# Patient Record
Sex: FEMALE | Race: WHITE | NOT HISPANIC OR LATINO | Employment: FULL TIME | URBAN - METROPOLITAN AREA
[De-identification: names, ages, dates, MRNs, and addresses within clinical notes are randomized per-mention and may not be internally consistent; named-entity substitution may affect disease eponyms.]

---

## 2019-08-08 ENCOUNTER — HOSPITAL ENCOUNTER (EMERGENCY)
Facility: HOSPITAL | Age: 29
Discharge: HOME | End: 2019-08-08
Attending: EMERGENCY MEDICINE
Payer: COMMERCIAL

## 2019-08-08 VITALS
WEIGHT: 293 LBS | TEMPERATURE: 98 F | HEART RATE: 72 BPM | DIASTOLIC BLOOD PRESSURE: 72 MMHG | SYSTOLIC BLOOD PRESSURE: 127 MMHG | HEIGHT: 68 IN | RESPIRATION RATE: 20 BRPM | BODY MASS INDEX: 44.41 KG/M2 | OXYGEN SATURATION: 100 %

## 2019-08-08 DIAGNOSIS — R07.9 CHEST PAIN: ICD-10-CM

## 2019-08-08 LAB
ALBUMIN SERPL BCP-MCNC: 4 G/DL (ref 3.5–5.2)
ALP SERPL-CCNC: 54 U/L (ref 55–135)
ALT SERPL W/O P-5'-P-CCNC: 23 U/L (ref 10–44)
ANION GAP SERPL CALC-SCNC: 8 MMOL/L (ref 8–16)
AST SERPL-CCNC: 21 U/L (ref 10–40)
B-HCG UR QL: NEGATIVE
BASOPHILS # BLD AUTO: 0.07 K/UL (ref 0–0.2)
BASOPHILS NFR BLD: 0.8 % (ref 0–1.9)
BILIRUB SERPL-MCNC: 0.8 MG/DL (ref 0.1–1)
BNP SERPL-MCNC: 30 PG/ML (ref 0–99)
BUN SERPL-MCNC: 11 MG/DL (ref 6–20)
CALCIUM SERPL-MCNC: 8.8 MG/DL (ref 8.7–10.5)
CHLORIDE SERPL-SCNC: 105 MMOL/L (ref 95–110)
CO2 SERPL-SCNC: 24 MMOL/L (ref 23–29)
CREAT SERPL-MCNC: 0.7 MG/DL (ref 0.5–1.4)
CTP QC/QA: YES
DIFFERENTIAL METHOD: ABNORMAL
EOSINOPHIL # BLD AUTO: 0.2 K/UL (ref 0–0.5)
EOSINOPHIL NFR BLD: 1.8 % (ref 0–8)
ERYTHROCYTE [DISTWIDTH] IN BLOOD BY AUTOMATED COUNT: 12.9 % (ref 11.5–14.5)
EST. GFR  (AFRICAN AMERICAN): >60 ML/MIN/1.73 M^2
EST. GFR  (NON AFRICAN AMERICAN): >60 ML/MIN/1.73 M^2
GLUCOSE SERPL-MCNC: 122 MG/DL (ref 70–110)
HCT VFR BLD AUTO: 39.5 % (ref 37–48.5)
HGB BLD-MCNC: 12.7 G/DL (ref 12–16)
IMM GRANULOCYTES # BLD AUTO: 0.06 K/UL (ref 0–0.04)
IMM GRANULOCYTES NFR BLD AUTO: 0.7 % (ref 0–0.5)
LYMPHOCYTES # BLD AUTO: 2.8 K/UL (ref 1–4.8)
LYMPHOCYTES NFR BLD: 31.7 % (ref 18–48)
MCH RBC QN AUTO: 26.5 PG (ref 27–31)
MCHC RBC AUTO-ENTMCNC: 32.2 G/DL (ref 32–36)
MCV RBC AUTO: 82 FL (ref 82–98)
MONOCYTES # BLD AUTO: 0.5 K/UL (ref 0.3–1)
MONOCYTES NFR BLD: 6.2 % (ref 4–15)
NEUTROPHILS # BLD AUTO: 5.1 K/UL (ref 1.8–7.7)
NEUTROPHILS NFR BLD: 58.8 % (ref 38–73)
NRBC BLD-RTO: 0 /100 WBC
PLATELET # BLD AUTO: 293 K/UL (ref 150–350)
PMV BLD AUTO: 10.2 FL (ref 9.2–12.9)
POTASSIUM SERPL-SCNC: 3.6 MMOL/L (ref 3.5–5.1)
PROT SERPL-MCNC: 7.1 G/DL (ref 6–8.4)
RBC # BLD AUTO: 4.8 M/UL (ref 4–5.4)
SODIUM SERPL-SCNC: 137 MMOL/L (ref 136–145)
TROPONIN I SERPL DL<=0.01 NG/ML-MCNC: <0.03 NG/ML (ref 0.02–0.04)
TROPONIN I SERPL DL<=0.01 NG/ML-MCNC: <0.03 NG/ML (ref 0.02–0.04)
WBC # BLD AUTO: 8.67 K/UL (ref 3.9–12.7)

## 2019-08-08 PROCEDURE — 25000003 PHARM REV CODE 250: Performed by: EMERGENCY MEDICINE

## 2019-08-08 PROCEDURE — 84484 ASSAY OF TROPONIN QUANT: CPT

## 2019-08-08 PROCEDURE — 99285 EMERGENCY DEPT VISIT HI MDM: CPT

## 2019-08-08 PROCEDURE — 80053 COMPREHEN METABOLIC PANEL: CPT

## 2019-08-08 PROCEDURE — 85025 COMPLETE CBC W/AUTO DIFF WBC: CPT

## 2019-08-08 PROCEDURE — 83880 ASSAY OF NATRIURETIC PEPTIDE: CPT

## 2019-08-08 PROCEDURE — 81025 URINE PREGNANCY TEST: CPT | Performed by: EMERGENCY MEDICINE

## 2019-08-08 PROCEDURE — 93005 ELECTROCARDIOGRAM TRACING: CPT

## 2019-08-08 RX ORDER — METFORMIN HYDROCHLORIDE 500 MG/1
500 TABLET, EXTENDED RELEASE ORAL
COMMUNITY

## 2019-08-08 RX ORDER — OMEPRAZOLE 20 MG/1
20 CAPSULE, DELAYED RELEASE ORAL EVERY MORNING
COMMUNITY

## 2019-08-08 RX ORDER — ERGOCALCIFEROL 1.25 MG/1
50000 CAPSULE ORAL
COMMUNITY

## 2019-08-08 RX ORDER — ASPIRIN 325 MG
325 TABLET ORAL
Status: COMPLETED | OUTPATIENT
Start: 2019-08-08 | End: 2019-08-08

## 2019-08-08 RX ADMIN — ASPIRIN 325 MG ORAL TABLET 325 MG: 325 PILL ORAL at 02:08

## 2019-08-08 NOTE — ED PROVIDER NOTES
Encounter Date: 8/8/2019       History     Chief Complaint   Patient presents with    Chest Pain     epigastric pain and chest pain since last week     28-year-old female presents emergency department complaining of soreness of the chest for the past week.  Patient was seen at a physician's office about a week ago and was diagnosed of having a pulled muscle.  Patient says the pain is persistent and now it feels like tightness along with pressure and his pain is persistent came here.  Patient denies fever or chills or nausea or vomiting or shortness of breath or abdominal pain or weakness and numbness.  Patient states the pain is worse with movement.  Patient denies any worsening of pain with exertion.  The patient was told to follow up with a cardiologist for further testing given she had chest pain about a week ago.        Review of patient's allergies indicates:   Allergen Reactions    Ceclor [cefaclor] Hives     No past medical history on file.  No past surgical history on file.  No family history on file.  Social History     Tobacco Use    Smoking status: Not on file   Substance Use Topics    Alcohol use: Not on file    Drug use: Not on file     Review of Systems   Constitutional: Negative.    HENT: Negative.    Eyes: Negative.    Respiratory: Negative.    Cardiovascular: Positive for chest pain.   Gastrointestinal: Negative.    Endocrine: Negative.    Genitourinary: Negative.    Musculoskeletal: Negative.    Skin: Negative.    Allergic/Immunologic: Negative.    Neurological: Negative.    Hematological: Negative.    Psychiatric/Behavioral: Negative.    All other systems reviewed and are negative.      Physical Exam     Initial Vitals [08/08/19 1257]   BP Pulse Resp Temp SpO2   (!) 179/84 83 17 98.5 °F (36.9 °C) 99 %      MAP       --         Physical Exam    Nursing note and vitals reviewed.  Constitutional: She appears well-developed and well-nourished.   HENT:   Head: Normocephalic and atraumatic.   Nose:  Nose normal.   Mouth/Throat: Oropharynx is clear and moist.   Eyes: Conjunctivae and EOM are normal. Pupils are equal, round, and reactive to light.   Neck: Normal range of motion. Neck supple. No thyromegaly present. No tracheal deviation present. No JVD present.   Cardiovascular: Normal rate, regular rhythm, normal heart sounds and intact distal pulses.   No murmur heard.  Pulmonary/Chest: Breath sounds normal. No stridor. No respiratory distress. She has no wheezes. She has no rales.   Abdominal: Soft. Bowel sounds are normal.   Musculoskeletal: Normal range of motion. She exhibits no edema.   Neurological: She is alert and oriented to person, place, and time. She has normal strength. No cranial nerve deficit. GCS score is 15. GCS eye subscore is 4. GCS verbal subscore is 5. GCS motor subscore is 6.   Skin: Skin is warm. Capillary refill takes less than 2 seconds.   Psychiatric: She has a normal mood and affect. Thought content normal.         ED Course   Procedures  Labs Reviewed   CBC W/ AUTO DIFFERENTIAL   COMPREHENSIVE METABOLIC PANEL   TROPONIN I   B-TYPE NATRIURETIC PEPTIDE     EKG Readings: (Independently Interpreted)   Rhythm: Normal Sinus Rhythm. Ectopy: No Ectopy. Conduction: Normal. ST Segments: Normal ST Segments. T Waves: Normal. Clinical Impression: Normal Sinus Rhythm     ECG Results          EKG 12-lead (In process)  Result time 08/08/19 13:32:47    In process by Interface, Lab In [facility] (08/08/19 13:32:47)                 Narrative:    Test Reason : R07.9,    Vent. Rate : 085 BPM     Atrial Rate : 085 BPM     P-R Int : 190 ms          QRS Dur : 090 ms      QT Int : 374 ms       P-R-T Axes : 037 001 027 degrees     QTc Int : 445 ms    Normal sinus rhythm  Cannot rule out Anterior infarct ,age undetermined  Abnormal ECG  No previous ECGs available    Referred By:             Confirmed By:                             Imaging Results          X-Ray Chest PA And Lateral (In process)                                     ED Course as of Aug 08 1723   u Aug 08, 2019   1640 X-Ray Chest PA And Lateral []   1721 Patient with vague low risk chest pain for the past week.  Pain likely musculoskeletal.  Screening labs reviewed.  Troponin unremarkable. EKG within normal limits and has symptoms resolved and patient does not appear to have cardiac chest pain will discharge with instructions and follow up with outpatient cardiology    [UM]      ED Course User Index  [UM] Keaton Pires MD     Clinical Impression:       ICD-10-CM ICD-9-CM   1. Chest pain R07.9 786.50                                Keaton Pires MD  08/08/19 1724

## 2019-08-08 NOTE — ED NOTES
Pt states she had chest pressure and yesterday she had tingling to the left arm which made her come in today. Shes had it on and off for the last week. She said she thought she pulled a muscle last week so she went to the doctor. They did an EKG and said it looked abnormal. She sent up an appt with a cardiologist, but hasn't seen them yet.

## 2024-05-27 ENCOUNTER — OFFICE VISIT (OUTPATIENT)
Dept: URGENT CARE | Facility: CLINIC | Age: 34
End: 2024-05-27
Payer: COMMERCIAL

## 2024-05-27 VITALS
WEIGHT: 293 LBS | BODY MASS INDEX: 44.41 KG/M2 | DIASTOLIC BLOOD PRESSURE: 82 MMHG | TEMPERATURE: 99 F | RESPIRATION RATE: 17 BRPM | SYSTOLIC BLOOD PRESSURE: 130 MMHG | HEART RATE: 110 BPM | HEIGHT: 68 IN | OXYGEN SATURATION: 98 %

## 2024-05-27 DIAGNOSIS — J06.9 UPPER RESPIRATORY TRACT INFECTION, UNSPECIFIED TYPE: ICD-10-CM

## 2024-05-27 DIAGNOSIS — R05.9 COUGH, UNSPECIFIED TYPE: ICD-10-CM

## 2024-05-27 DIAGNOSIS — J02.9 SORE THROAT: Primary | ICD-10-CM

## 2024-05-27 LAB
CTP QC/QA: YES
CTP QC/QA: YES
FLUAV AG NPH QL: NEGATIVE
FLUBV AG NPH QL: NEGATIVE
S PYO RRNA THROAT QL PROBE: NEGATIVE

## 2024-05-27 PROCEDURE — 87804 INFLUENZA ASSAY W/OPTIC: CPT | Mod: QW,,, | Performed by: STUDENT IN AN ORGANIZED HEALTH CARE EDUCATION/TRAINING PROGRAM

## 2024-05-27 PROCEDURE — 87880 STREP A ASSAY W/OPTIC: CPT | Mod: QW,,, | Performed by: STUDENT IN AN ORGANIZED HEALTH CARE EDUCATION/TRAINING PROGRAM

## 2024-05-27 PROCEDURE — 99204 OFFICE O/P NEW MOD 45 MIN: CPT | Mod: S$GLB,,, | Performed by: STUDENT IN AN ORGANIZED HEALTH CARE EDUCATION/TRAINING PROGRAM

## 2024-05-27 RX ORDER — FLUTICASONE PROPIONATE 50 MCG
1 SPRAY, SUSPENSION (ML) NASAL DAILY
Qty: 11.1 ML | Refills: 0 | Status: SHIPPED | OUTPATIENT
Start: 2024-05-27

## 2024-05-27 RX ORDER — PROMETHAZINE HYDROCHLORIDE AND DEXTROMETHORPHAN HYDROBROMIDE 6.25; 15 MG/5ML; MG/5ML
5 SYRUP ORAL EVERY 4 HOURS PRN
Qty: 118 ML | Refills: 0 | Status: SHIPPED | OUTPATIENT
Start: 2024-05-27 | End: 2024-06-06

## 2024-05-27 RX ORDER — LEVOCETIRIZINE DIHYDROCHLORIDE 5 MG/1
5 TABLET, FILM COATED ORAL NIGHTLY
Qty: 30 TABLET | Refills: 0 | Status: SHIPPED | OUTPATIENT
Start: 2024-05-27 | End: 2025-05-27

## 2024-05-27 RX ORDER — MONTELUKAST SODIUM 10 MG/1
10 TABLET ORAL NIGHTLY
Qty: 30 TABLET | Refills: 0 | Status: SHIPPED | OUTPATIENT
Start: 2024-05-27 | End: 2024-06-26

## 2024-05-27 NOTE — PROGRESS NOTES
"Subjective:      Patient ID: Neida Fernández is a 34 y.o. female.    Vitals:  height is 5' 8" (1.727 m) and weight is 167.8 kg (370 lb) (abnormal). Her oral temperature is 98.5 °F (36.9 °C). Her blood pressure is 130/82 and her pulse is 110. Her respiration is 17 and oxygen saturation is 98%.     Chief Complaint: Cough    Ambulatory to room with complaint of congestion and sinus pressure x2 days.    Cough  This is a new problem. The current episode started in the past 7 days (4 days). The problem has been unchanged. The problem occurs constantly. The cough is Non-productive. Associated symptoms include chills, a fever, headaches, nasal congestion, postnasal drip and a sore throat.       Constitution: Positive for chills and fever.   HENT:  Positive for postnasal drip and sore throat.    Neck: neck negative.   Cardiovascular: Negative.    Eyes: Negative.    Respiratory:  Positive for cough.    Gastrointestinal: Negative.    Genitourinary: Negative.    Musculoskeletal: Negative.    Skin: Negative.    Allergic/Immunologic: Negative.    Neurological:  Positive for headaches.   Psychiatric/Behavioral: Negative.        Objective:     Physical Exam   HENT:   Ears:   Right Ear: Tympanic membrane normal.   Left Ear: Tympanic membrane normal.   Nose: Rhinorrhea and congestion present.   Mouth/Throat: Posterior oropharyngeal erythema present. No oropharyngeal exudate.       Assessment:     1. Sore throat    2. Cough, unspecified type    3. Upper respiratory tract infection, unspecified type        Plan:       Sore throat  -     POCT rapid strep A    Cough, unspecified type  -     POCT Influenza A/B Rapid Antigen    Upper respiratory tract infection, unspecified type    Other orders  -     levocetirizine (XYZAL) 5 MG tablet; Take 1 tablet (5 mg total) by mouth every evening.  Dispense: 30 tablet; Refill: 0  -     montelukast (SINGULAIR) 10 mg tablet; Take 1 tablet (10 mg total) by mouth every evening.  Dispense: 30 tablet; " Refill: 0  -     promethazine-dextromethorphan (PROMETHAZINE-DM) 6.25-15 mg/5 mL Syrp; Take 5 mLs by mouth every 4 (four) hours as needed.  Dispense: 118 mL; Refill: 0  -     benzocaine-menthoL 15-3.6 mg Lozg; 1 each by Mucous Membrane route every 4 (four) hours as needed.  Dispense: 18 lozenge; Refill: 0  -     fluticasone propionate (FLONASE) 50 mcg/actuation nasal spray; 1 spray (50 mcg total) by Each Nostril route once daily.  Dispense: 11.1 mL; Refill: 0           Strep flu negative in clinic.

## 2024-05-29 ENCOUNTER — OFFICE VISIT (OUTPATIENT)
Dept: URGENT CARE | Facility: CLINIC | Age: 34
End: 2024-05-29
Payer: COMMERCIAL

## 2024-05-29 VITALS
RESPIRATION RATE: 24 BRPM | BODY MASS INDEX: 44.41 KG/M2 | WEIGHT: 293 LBS | SYSTOLIC BLOOD PRESSURE: 139 MMHG | HEIGHT: 68 IN | DIASTOLIC BLOOD PRESSURE: 78 MMHG | TEMPERATURE: 101 F | OXYGEN SATURATION: 96 % | HEART RATE: 87 BPM

## 2024-05-29 DIAGNOSIS — J06.9 UPPER RESPIRATORY TRACT INFECTION, UNSPECIFIED TYPE: Primary | ICD-10-CM

## 2024-05-29 PROCEDURE — 99214 OFFICE O/P EST MOD 30 MIN: CPT | Mod: S$GLB,,, | Performed by: NURSE PRACTITIONER

## 2024-05-29 RX ORDER — DOXYCYCLINE HYCLATE 100 MG
100 TABLET ORAL 2 TIMES DAILY
Qty: 20 TABLET | Refills: 0 | Status: SHIPPED | OUTPATIENT
Start: 2024-05-29 | End: 2024-06-08

## 2024-05-29 RX ORDER — PROMETHAZINE HYDROCHLORIDE AND DEXTROMETHORPHAN HYDROBROMIDE 6.25; 15 MG/5ML; MG/5ML
5 SYRUP ORAL EVERY 6 HOURS PRN
Qty: 118 ML | Refills: 0 | Status: SHIPPED | OUTPATIENT
Start: 2024-05-29 | End: 2024-06-08

## 2024-05-29 NOTE — PROGRESS NOTES
CHIEF COMPLAINT  Chief Complaint   Patient presents with    Cough       HPI  Neida Murdock a 34 y.o. female who presents with c/o worsening symptoms. Pt reports she was seen here last Friday and treated as viral syndrome with script for promethazine dm, flonase, benzocaine-menthol, xyzal, singulair. Pt reports symptoms have become progressively worse, she now reports a productive cough and fever. Pt reports temp resolves with tylenol but returns in a few hours. Denies chest pain, sob, abdominal pain, n/v/d. Last tylenol was at 9 am this morning.       CURRENT MEDICATIONS  Current Outpatient Medications on File Prior to Visit   Medication Sig Dispense Refill    benzocaine-menthoL 15-3.6 mg Lozg 1 each by Mucous Membrane route every 4 (four) hours as needed. 18 lozenge 0    ergocalciferol (ERGOCALCIFEROL) 50,000 unit Cap Take 50,000 Units by mouth 3 (three) times a week. ON Tuesday, Thursday, AND Friday      fluticasone propionate (FLONASE) 50 mcg/actuation nasal spray 1 spray (50 mcg total) by Each Nostril route once daily. 11.1 mL 0    levocetirizine (XYZAL) 5 MG tablet Take 1 tablet (5 mg total) by mouth every evening. 30 tablet 0    metFORMIN (GLUCOPHAGE-XR) 500 MG 24 hr tablet Take 500 mg by mouth daily with breakfast.      montelukast (SINGULAIR) 10 mg tablet Take 1 tablet (10 mg total) by mouth every evening. 30 tablet 0    multivitamin (THERAGRAN) tablet Take 1 tablet by mouth every morning.      om 3/E/linol/ala/oleic/gla/lip (OMEGA 3-6-9 ORAL) Take 1 capsule by mouth every morning.      omeprazole (PRILOSEC) 20 MG capsule Take 20 mg by mouth every morning.      promethazine-dextromethorphan (PROMETHAZINE-DM) 6.25-15 mg/5 mL Syrp Take 5 mLs by mouth every 4 (four) hours as needed. 118 mL 0     No current facility-administered medications on file prior to visit.       ALLERGIES  Review of patient's allergies indicates:   Allergen Reactions    Cefaclor Hives and Rash       Immunization History   Administered  Date(s) Administered    COVID-19, MRNA, LN-S, PF (MODERNA FULL 0.5 ML DOSE) 02/03/2021, 03/03/2021       PAST MEDICAL HISTORY  History reviewed. No pertinent past medical history.    SURGICAL HISTORY  History reviewed. No pertinent surgical history.    SOCIAL HISTORY  Social History     Socioeconomic History    Marital status: Single       FAMILY HISTORY  No family history on file.    REVIEW OF SYSTEMS  Constitutional: + fever,+ chills  Eyes: No redness, pain, or discharge  HENT: No ear pain, no headache, no rhinorrhea, no throat pain  Respiratory: + cough, no wheezing or shortness of breath  Cardiovascular: No chest pain, palpitations or edema  GI: No abdominal pain, nausea, vomiting or diarrhea  Skin: No rash or abrasion  Neurologic: No focal weakness or sensory changes.  All systems otherwise negative except as noted in the Review of Systems and History of Present Illness      PHYSICAL EXAM  Reviewed Triage Note  VITAL SIGNS: 139/78  Constitutional: Well developed, well nourished, Alert and oriented x3, No acute distress, obviously ill.  HENT: Normocephalic, Atraumatic, Bilateral external ears normal, bilateral ear canals clear, external nose negative, oropharynx moist, No oral exudates, edema or erythema  Eyes: PERRL, EOMI, Conjunctiva normal, No discharge.  Neck: Normal range of motion, no tenderness, supple, no carotid bruits  Respiratory: Normal breath sounds, no respiratory distress, no wheezing, no rhonchi, no rales  Cardiovascular: HR 87, normal rhythm, no murmurs, no rubs, no gallops.  Neurologic: Normal motor function, normal sensory function. No focal deficits noted. Intact distal pulses  Psychiatric: Affect normal, judgment normal, mood normal      MEDICAL DECISION MAKING    Physical exam findings discussed with patient. No acute emergent medical condition identified at this time to warrant further testing. Will dispo home with instructions to follow up with PCP tomorrow. Script for promethazine-dm and  doxycycline sent to pharmacy of choice with instructions on usage. Pt agrees with plan of care.     DISPOSITION  Patient discharged in stable condition       CLINICAL IMPRESSION:  The encounter diagnosis was Upper respiratory tract infection, unspecified type.    Patient advised to follow-up with your PCP within 3 days for BP re-check if Blood Pressure was >120/80 without history of hypertension.

## 2024-05-29 NOTE — PROGRESS NOTES
"Subjective:      Patient ID: Neida Fernández is a 34 y.o. female.    Vitals:  height is 5' 8" (1.727 m) and weight is 167.8 kg (370 lb) (abnormal). Her temperature is 100.8 °F (38.2 °C) (abnormal). Her blood pressure is 139/78 and her pulse is 87. Her respiration is 24 (abnormal) and oxygen saturation is 96%.     Chief Complaint: Cough    Cough  This is a new problem. The current episode started in the past 7 days (x's 6 days). The problem has been gradually worsening. The cough is Productive of sputum. Associated symptoms include chills, ear congestion, a fever, headaches, nasal congestion, postnasal drip, a sore throat and wheezing. Pertinent negatives include no ear pain. Treatments tried: singulair, xyzal mucinex. The treatment provided no relief. Her past medical history is significant for bronchitis.       Constitution: Positive for chills and fever.   HENT:  Positive for postnasal drip and sore throat. Negative for ear pain.    Respiratory:  Positive for cough and wheezing.    Neurological:  Positive for headaches.      Objective:     Physical Exam    Assessment:     No diagnosis found.    Plan:       There are no diagnoses linked to this encounter.                "